# Patient Record
Sex: FEMALE | URBAN - METROPOLITAN AREA
[De-identification: names, ages, dates, MRNs, and addresses within clinical notes are randomized per-mention and may not be internally consistent; named-entity substitution may affect disease eponyms.]

---

## 2023-07-13 ENCOUNTER — OFFICE VISIT (OUTPATIENT)
Facility: LOCATION | Age: 68
End: 2023-07-13
Payer: MEDICARE

## 2023-07-13 DIAGNOSIS — H25.813 COMBINED FORMS OF AGE-RELATED CATARACT, BILATERAL: Primary | ICD-10-CM

## 2023-07-13 DIAGNOSIS — E11.9 TYPE 2 DIABETES MELLITUS WITHOUT COMPLICATIONS: ICD-10-CM

## 2023-07-13 DIAGNOSIS — H40.013 OPEN ANGLE WITH BORDERLINE FINDINGS, LOW RISK, BILATERAL: ICD-10-CM

## 2023-07-13 PROCEDURE — 99214 OFFICE O/P EST MOD 30 MIN: CPT | Performed by: OPHTHALMOLOGY

## 2023-07-13 ASSESSMENT — INTRAOCULAR PRESSURE
OD: 18
OS: 18

## 2023-07-13 ASSESSMENT — VISUAL ACUITY
OS: 20/40
OD: 20/50

## 2023-07-13 NOTE — IMPRESSION/PLAN
Impression: Patient has type II diabetes with no complications. OCT MAC done today 7/1/23. Discussed with patient that she has no diabetic abnormalities affecting her vision at this time. (-) diabetic retinopathy Plan: Patient to continue care with primary care physician. Patient to maintain stable blood sugar and A1c levels.

## 2023-07-13 NOTE — IMPRESSION/PLAN
Impression: Combined forms of age-related cataract, bilateral: H25.813. Patient expresses she wants to know more about Cat sx. Discussed with patient Cat sx is to give her better VA. Patient expressed understanding and does not wish to proceed as she is not visually bothered. Exam shows: 
2+ NS OU
2+ AC OU Plan: Patient currently has no functional problems due to cataracts. No cataract surgery indicated in todays visit until patient is bothered. RTC in 6 months with Dr. Cotton.

## 2023-07-13 NOTE — IMPRESSION/PLAN
Impression: Examination revealed the patient is at risk for glaucoma based on elevated IOP. Exam shows no Glaucoma present. IOP: 18/18 Plan: Glaucoma evaluation revealed no treatment is needed at this time.